# Patient Record
Sex: FEMALE | Race: OTHER | Employment: UNEMPLOYED | URBAN - METROPOLITAN AREA
[De-identification: names, ages, dates, MRNs, and addresses within clinical notes are randomized per-mention and may not be internally consistent; named-entity substitution may affect disease eponyms.]

---

## 2019-05-07 ENCOUNTER — HOSPITAL ENCOUNTER (EMERGENCY)
Age: 29
Discharge: HOME | End: 2019-05-07
Payer: COMMERCIAL

## 2019-05-07 VITALS
WEIGHT: 172.62 LBS | BODY MASS INDEX: 31.77 KG/M2 | TEMPERATURE: 97.7 F | DIASTOLIC BLOOD PRESSURE: 75 MMHG | SYSTOLIC BLOOD PRESSURE: 126 MMHG | HEIGHT: 62 IN | RESPIRATION RATE: 16 BRPM | HEART RATE: 87 BPM | OXYGEN SATURATION: 100 %

## 2019-05-07 DIAGNOSIS — J40 TRACHEOBRONCHITIS: Primary | ICD-10-CM

## 2019-05-07 LAB
EKG ATRIAL RATE: 88 BPM
EKG DIAGNOSIS: NORMAL
EKG P AXIS: 54 DEGREES
EKG P-R INTERVAL: 156 MS
EKG Q-T INTERVAL: 360 MS
EKG QRS DURATION: 84 MS
EKG QTC CALCULATION (BAZETT): 435 MS
EKG R AXIS: 16 DEGREES
EKG T AXIS: 17 DEGREES
EKG VENTRICULAR RATE: 88 BPM

## 2019-05-07 PROCEDURE — 93010 ELECTROCARDIOGRAM REPORT: CPT | Performed by: INTERNAL MEDICINE

## 2019-05-07 PROCEDURE — 93005 ELECTROCARDIOGRAM TRACING: CPT | Performed by: NURSE PRACTITIONER

## 2019-05-07 PROCEDURE — 6370000000 HC RX 637 (ALT 250 FOR IP): Performed by: NURSE PRACTITIONER

## 2019-05-07 PROCEDURE — 99283 EMERGENCY DEPT VISIT LOW MDM: CPT

## 2019-05-07 RX ORDER — GUAIFENESIN/DEXTROMETHORPHAN 100-10MG/5
10 SYRUP ORAL ONCE
Status: COMPLETED | OUTPATIENT
Start: 2019-05-07 | End: 2019-05-07

## 2019-05-07 RX ORDER — GUAIFENESIN/DEXTROMETHORPHAN 100-10MG/5
5 SYRUP ORAL 3 TIMES DAILY PRN
Qty: 120 ML | Refills: 0 | Status: SHIPPED | OUTPATIENT
Start: 2019-05-07 | End: 2019-05-17

## 2019-05-07 RX ORDER — PREDNISONE 10 MG/1
20 TABLET ORAL DAILY
Qty: 30 TABLET | Refills: 0 | Status: SHIPPED | OUTPATIENT
Start: 2019-05-07 | End: 2019-05-12

## 2019-05-07 RX ORDER — PREDNISONE 20 MG/1
60 TABLET ORAL ONCE
Status: COMPLETED | OUTPATIENT
Start: 2019-05-07 | End: 2019-05-07

## 2019-05-07 RX ADMIN — GUAIFENESIN AND DEXTROMETHORPHAN 10 ML: 100; 10 SYRUP ORAL at 11:16

## 2019-05-07 RX ADMIN — PREDNISONE 60 MG: 20 TABLET ORAL at 11:17

## 2019-05-07 ASSESSMENT — PAIN DESCRIPTION - PAIN TYPE
TYPE: ACUTE PAIN

## 2019-05-07 ASSESSMENT — PAIN DESCRIPTION - PROGRESSION
CLINICAL_PROGRESSION: NOT CHANGED
CLINICAL_PROGRESSION: GRADUALLY IMPROVING
CLINICAL_PROGRESSION: NOT CHANGED
CLINICAL_PROGRESSION: NOT CHANGED

## 2019-05-07 ASSESSMENT — PAIN DESCRIPTION - ORIENTATION
ORIENTATION: MID

## 2019-05-07 ASSESSMENT — PAIN DESCRIPTION - ONSET
ONSET: PROGRESSIVE

## 2019-05-07 ASSESSMENT — PAIN DESCRIPTION - FREQUENCY
FREQUENCY: CONTINUOUS

## 2019-05-07 ASSESSMENT — PAIN SCALES - GENERAL
PAINLEVEL_OUTOF10: 6
PAINLEVEL_OUTOF10: 7
PAINLEVEL_OUTOF10: 6
PAINLEVEL_OUTOF10: 6

## 2019-05-07 ASSESSMENT — PAIN DESCRIPTION - LOCATION
LOCATION: CHEST

## 2019-05-07 ASSESSMENT — PAIN - FUNCTIONAL ASSESSMENT
PAIN_FUNCTIONAL_ASSESSMENT: ACTIVITIES ARE NOT PREVENTED
PAIN_FUNCTIONAL_ASSESSMENT: 0-10
PAIN_FUNCTIONAL_ASSESSMENT: ACTIVITIES ARE NOT PREVENTED

## 2019-05-07 ASSESSMENT — PAIN DESCRIPTION - DESCRIPTORS
DESCRIPTORS: TIGHTNESS;DISCOMFORT
DESCRIPTORS: TIGHTNESS;DISCOMFORT
DESCRIPTORS: DISCOMFORT;TIGHTNESS
DESCRIPTORS: DISCOMFORT;TIGHTNESS

## 2019-05-07 ASSESSMENT — ENCOUNTER SYMPTOMS
COUGH: 1
CHEST TIGHTNESS: 1
GASTROINTESTINAL NEGATIVE: 1

## 2019-05-07 NOTE — ED PROVIDER NOTES
[facility]  eMERGENCY dEPARTMENT eNCOUnter    Pt Name: @@  MRN: [id number]  [id number]  Date of evaluation: 5/7/2019  Provider: RAJ Beck CNP     Evaluated by the advanced practice provider    CHIEF COMPLAINT       Chief Complaint   Patient presents with    Chest Congestion     c/o chest congestion and cough, cold s/s x 5 days. c/o cp with coughing    Cough         HISTORY OF PRESENT ILLNESS  (Location/Symptom, Timing/Onset, Context/Setting, Quality, Duration, Modifying Factors, Severity.)   Michelle Mart is a 33 y.o. female who presents to the emergencydepartment complains of cough and chest congestion bilateral chest discomfort associated with cough. Cough intermittently productive. For the first 2-3 days of the illness she experienced chills sweats\" may be had a fever. \" Patient reports that her 2 sons have had viral cold and she gave him Robitussin however she did not treat her symptoms. Denies tobacco smoking. Denies a history of asthma. Denies any recent travel. Initially had runny nose no runny nose now. Negative for ear pain. Throat is scratchy and irritated at times and hurts to cough. Cough can sometimes be worse at night. Denies any posttussive emesis. Nursing Notes were reviewed and I agree. REVIEW OF SYSTEMS    (2-9 systems for level 4, 10 or more for level 5)     Review of Systems   Constitutional: Negative. HENT: Negative. Respiratory: Positive for cough and chest tightness. Cardiovascular: Positive for chest pain. Gastrointestinal: Negative. Genitourinary: Negative. Musculoskeletal: Negative. Neurological: Negative. Except as noted above the remainder of the review of systems was reviewed and negative. PAST MEDICAL HISTORY         Diagnosis Date    Abnormal Pap smear        SURGICAL HISTORY     History reviewed. No pertinent surgical history.     CURRENT MEDICATIONS       Previous Medications TRI-SPRINTEC 0.18/0.215/0.25 MG-35 MCG TABS    Take 1 tablet by mouth daily       ALLERGIES     Patient has no known allergies. FAMILY HISTORY           Problem Relation Age of Onset    Asthma Father     Miscarriages / Stillbirths Sister     Asthma Brother     Hearing Loss Brother     Cancer Maternal Grandfather     High Blood Pressure Paternal Grandmother     Cancer Paternal Grandfather      Family Status   Relation Name Status    Father  (Not Specified)    Sister  (Not Specified)    Brother  (Not Specified)    MGF  (Not Specified)    PGM  (Not Specified)    PGF  (Not Specified)        SOCIAL HISTORY      reports that she has been smoking cigarettes. She has never used smokeless tobacco. She reports that she does not drink alcohol or use drugs. PHYSICAL EXAM    (up to 7 for level 4, 8 or more for level 5)      ED Triage Vitals [05/07/19 1023]   BP Temp Temp Source Pulse Resp SpO2 Height Weight   120/72 97.7 °F (36.5 °C) Oral 94 16 100 % 5' 2\" (1.575 m) 172 lb 9.9 oz (78.3 kg)       Physical Exam   Constitutional: She is oriented to person, place, and time. Vital signs are normal. She appears well-developed and well-nourished. She is cooperative. Non-toxic appearance. She does not have a sickly appearance. She does not appear ill. No distress. HENT:   Head: Normocephalic. Nose: Nose normal.   Mouth/Throat: Uvula is midline, oropharynx is clear and moist and mucous membranes are normal. No trismus in the jaw. No uvula swelling. No oropharyngeal exudate, posterior oropharyngeal edema, posterior oropharyngeal erythema or tonsillar abscesses. Tonsils are 0 on the right. Tonsils are 0 on the left. Eyes: Pupils are equal, round, and reactive to light. Neck: Normal range of motion. Neck supple. No JVD present. Cardiovascular: Normal rate, regular rhythm and normal heart sounds. Pulmonary/Chest: Effort normal and breath sounds normal. No respiratory distress. She has no wheezes.  She has no rales. Negative egophony, negative bronchophony, negative pectoriloquy, negative tactile fremitus. Lung fields clear. Speech is clear and articulate. No retractions. No evidence of wheezing rales or rhonchi. Pulse oximetry 100% on room air. Patient speaks with ease. No distress. Color pink. Lymphadenopathy:     She has no cervical adenopathy. Neurological: She is alert and oriented to person, place, and time. Skin: Skin is warm and dry. Capillary refill takes less than 2 seconds. She is not diaphoretic. Nursing note and vitals reviewed. DIAGNOSTIC RESULTS     RADIOLOGY:   Non-plain film images such as CT, Ultrasound and MRI are read by the radiologist. Plain radiographic images are visualized and preliminarily interpreted by RAJ Jimenes CNP with the below findings:        Interpretation per the Radiologist below, if available at the time of this note:    No orders to display     EKG interpreted per Dr. Abhinav Fonseca, reviewed per myself normal sinus rhythm. Narrow complex. Negative for ST elevation or depression. No axis deviation. No evidence of ischemia or STEMI. No evidence of Brugada. Ventricular rate 88, LA interval 156, QRS 84, . No old EKGs for comparison. LABS:  Labs Reviewed - No data to display    All other labs were within normal range or not returned as of this dictation. EMERGENCY DEPARTMENT COURSE and DIFFERENTIAL DIAGNOSIS/MDM:   Vitals:    Vitals:    05/07/19 1023   BP: 120/72   Pulse: 94   Resp: 16   Temp: 97.7 °F (36.5 °C)   TempSrc: Oral   SpO2: 100%   Weight: 172 lb 9.9 oz (78.3 kg)   Height: 5' 2\" (1.575 m)       MDM    EKG was obtained per nursing protocol. Patient is not having cardiac-related chest pain. Her chest pain is related to cough and congestion for 5 days. She has no history of thromboembolic disorder. She is not hypoxic. She has no red flags or comorbidities. Patient was seen and evaluated per myself.  Dr. Mariusz Cruz present available for consultation as needed. Clinically the patient is not in distress non-ill appearing. No evidence of toxemia. Normal vital signs. She has a dry congested frequent cough. There is no evidence of otitis externa, no evidence of rhinosinusitis, no evidence of exudative pharyngitis. Clinically there is no evidence consistent with pneumonia or lower respiratory infection. I believe that this is viral tracheobronchitis given the patient's history, clinical exam and presentation. She has no red flags. I believe she can be safely discharged home and treated symptomatically with prednisone low dose for the next 5 days and Robitussin-DM. She is a healthy immunocompetent individual. No antibiotics indicated. She will receive an internal medicine referral for outpatient follow-up and establishment of primary care. PERC negative    PROCEDURES:  Procedures    FINAL IMPRESSION      1.  Tracheobronchitis          DISPOSITION/PLAN   DISPOSITION Decision To Discharge 05/07/2019 11:05:11 AM      PATIENT REFERRED TO:  [address] [place] [address] Pre-Services  [phone]          DISCHARGE MEDICATIONS:  New Prescriptions    GUAIFENESIN-DEXTROMETHORPHAN (ROBITUSSIN DM) 100-10 MG/5ML SYRUP    Take 5 mLs by mouth 3 times daily as needed for Cough    PREDNISONE (DELTASONE) 10 MG TABLET    Take 2 tablets by mouth daily for 5 doses       (Please note that portions of this note werecompleted with a voice recognition program.  Efforts were made to edit the dictations but occasionally words are mis-transcribed.)    Loida Ocampo, RAJ - RAJ Dewey - CNP  05/07/19 [id number]

## 2019-05-07 NOTE — ED NOTES
Assumed care of pt at this time. Pt c/o cold s/s, chest congestion, cough, and sore throat for past week. States that her children had same s/s one week ago. States pain in mid chest with coughing. Denies expectoration. Denies fever. Pt a & o x 3. Skin w/d, color pink. resp easy. bbs cta post. abd soft with (+) bs x 4 quads. Denies n/v/d. No pedal edema. Anderson (=) without deficit. Ambulatory with steady gait. Nad.       Gio Riley, SERAFIN  05/07/19 [id number]

## 2019-05-07 NOTE — ED NOTES
D/C instructions, including RX and follow up care given to pt. Pt verbalized understanding and denies further questions or needs at this time. Ambulatory to waiting room with steady gait. SD Bello RN  05/07/19 [id number]